# Patient Record
Sex: MALE
[De-identification: names, ages, dates, MRNs, and addresses within clinical notes are randomized per-mention and may not be internally consistent; named-entity substitution may affect disease eponyms.]

---

## 2020-08-16 ENCOUNTER — TRANSCRIPTION ENCOUNTER (OUTPATIENT)
Age: 30
End: 2020-08-16

## 2020-08-30 ENCOUNTER — TRANSCRIPTION ENCOUNTER (OUTPATIENT)
Age: 30
End: 2020-08-30

## 2022-10-05 PROBLEM — Z00.00 ENCOUNTER FOR PREVENTIVE HEALTH EXAMINATION: Status: ACTIVE | Noted: 2022-10-05

## 2022-10-14 ENCOUNTER — APPOINTMENT (OUTPATIENT)
Dept: PHYSICAL MEDICINE AND REHAB | Facility: CLINIC | Age: 32
End: 2022-10-14

## 2022-10-14 VITALS — BODY MASS INDEX: 29.68 KG/M2 | WEIGHT: 205 LBS | HEIGHT: 69.5 IN

## 2022-10-14 DIAGNOSIS — Z78.9 OTHER SPECIFIED HEALTH STATUS: ICD-10-CM

## 2022-10-14 DIAGNOSIS — M54.2 CERVICALGIA: ICD-10-CM

## 2022-10-14 DIAGNOSIS — M50.20 OTHER CERVICAL DISC DISPLACEMENT, UNSPECIFIED CERVICAL REGION: ICD-10-CM

## 2022-10-14 PROCEDURE — 99203 OFFICE O/P NEW LOW 30 MIN: CPT

## 2022-10-18 PROBLEM — M54.2 CERVICALGIA: Status: ACTIVE | Noted: 2022-10-18

## 2022-10-18 PROBLEM — M50.20 PROTRUSION OF CERVICAL INTERVERTEBRAL DISC: Status: ACTIVE | Noted: 2022-10-18

## 2022-10-18 PROBLEM — Z78.9 NON-SMOKER: Status: ACTIVE | Noted: 2022-10-14

## 2022-10-18 RX ORDER — MELOXICAM 15 MG/1
15 TABLET ORAL
Refills: 0 | Status: ACTIVE | COMMUNITY

## 2022-10-18 NOTE — HISTORY OF PRESENT ILLNESS
[FreeTextEntry1] : Referring Physician: Dr. Hollins\par \par 10/14/2022 Mr. NITO NIEVES is a very pleasant 32 year male who comes in for a second opinion of Neck Pain that has been ongoing for 6 weeks without any specific injury or inciting event. Patient has tried PT, Meloxicam, Acupuncture which did help temporarily. The pain is located primarily Neck Pain non-radiating intermittent and described as sharp, pinching, dull . The pain is rated as 4/10 and ranges from 2-10/10. The patient's symptoms are aggravated by driving and alleviated by Acupuncture. The patient works in  which consists of sitting, usage of computer, and also has a new baby at home. The patient denies any night pain, numbness/tingling, weakness, or bowel/bladder dysfunction. The patient has no other complaints at this time. \par \par \par \par

## 2022-10-18 NOTE — PHYSICAL EXAM
[FreeTextEntry1] : CERVICAL\par GEN: AAOx3. NAD.\par CERVICAL ROM: Flexion, extension, side-bending, rotation, oblique extension all full/pain free.  \par SHOULDER ROM: Full and pain free B/L.\par PALP: No TTP midline spinous processes, paravertebral muscles, trapezius, levator scapulae or shoulder region B/L.\par INSP: Spine alignment is midline, No evidence of scoliosis.\par STRENGTH: 5/5 bilateral shoulder abductors, elbow flexors, elbow extensors, wrist extensors, hand intrinsics, long finger flexors to D3 and D5.\par TONE: Normal, No clonus.\par REFLEXES: Symmetric biceps, triceps, brachioradialis, pronator teres. Cantrell's (-) B/L.\par SENSATION: Grossly intact LT BUE.\par GAIT: Non antalgic, Normal reciprocating heel to toe.\par SPECIAL: Spurling's (-) B/L. Axial Compression (-). \par

## 2022-10-18 NOTE — ASSESSMENT
[FreeTextEntry1] : Impression:\par 1. Cervicalgia/Myofascial Pain\par \par The history physical examination and imaging were reviewed. The C5/6 small central protrusion is unlikely to be the source for his pain, which seems more myofascial in etiology. The imaging findings and diagnosis were discussed with the patient along with treatment options including physical therapy, oral medication, interventional spine procedures, and surgery; as well as alternative therapeutics such as acupuncture and massage. \par \par Plan: \par 1. Continue PT/HEP and Acupuncture\par 2. Increase Regular Exercise Activity\par 3. Stress Reduction/Modification\par 4. Daily Turmeric Trial \par 5. Follow up 3 months\par \par We also discussed the importance of activity modification, ergonomics, and posture in the long term management of the condition. The patient was educated on red flag symptoms and was instructed to call the office should the current condition worsen or new symptoms develop. The patient expressed verbal understanding and is in agreement with the plan of care. All of the patient's questions and concerns were addressed during today's visit.

## 2023-10-17 ENCOUNTER — NON-APPOINTMENT (OUTPATIENT)
Age: 33
End: 2023-10-17

## 2024-04-25 ENCOUNTER — NON-APPOINTMENT (OUTPATIENT)
Age: 34
End: 2024-04-25